# Patient Record
Sex: FEMALE | RURAL
[De-identification: names, ages, dates, MRNs, and addresses within clinical notes are randomized per-mention and may not be internally consistent; named-entity substitution may affect disease eponyms.]

---

## 2017-09-25 ENCOUNTER — APPOINTMENT (OUTPATIENT)
Age: 62
Setting detail: DERMATOLOGY
End: 2017-09-25

## 2017-09-25 DIAGNOSIS — L71.8 OTHER ROSACEA: ICD-10-CM

## 2017-09-25 DIAGNOSIS — L81.4 OTHER MELANIN HYPERPIGMENTATION: ICD-10-CM

## 2017-09-25 PROBLEM — E03.9 HYPOTHYROIDISM, UNSPECIFIED: Status: ACTIVE | Noted: 2017-09-25

## 2017-09-25 PROCEDURE — OTHER PRESCRIPTION: OTHER

## 2017-09-25 PROCEDURE — 99203 OFFICE O/P NEW LOW 30 MIN: CPT

## 2017-09-25 PROCEDURE — OTHER TREATMENT REGIMEN: OTHER

## 2017-09-25 PROCEDURE — OTHER COUNSELING: OTHER

## 2017-09-25 RX ORDER — DOXYCYCLINE 40 MG/1
1 CAPSULE ORAL QD
Qty: 30 | Refills: 2 | Status: ERX | COMMUNITY
Start: 2017-09-25

## 2017-09-25 ASSESSMENT — LOCATION SIMPLE DESCRIPTION DERM
LOCATION SIMPLE: LEFT FOREARM
LOCATION SIMPLE: RIGHT FOREARM

## 2017-09-25 ASSESSMENT — LOCATION DETAILED DESCRIPTION DERM
LOCATION DETAILED: RIGHT PROXIMAL DORSAL FOREARM
LOCATION DETAILED: LEFT PROXIMAL DORSAL FOREARM

## 2017-09-25 ASSESSMENT — LOCATION ZONE DERM: LOCATION ZONE: ARM

## 2017-09-25 NOTE — PROCEDURE: TREATMENT REGIMEN
Detail Level: Zone
Samples Given: Finacea foam (Pt will call back if she wants rx called in)
Initiate Treatment: Oracea 40mg 1 pill daily